# Patient Record
(demographics unavailable — no encounter records)

---

## 2024-12-25 NOTE — HEALTH RISK ASSESSMENT
[Good] : ~his/her~  mood as  good [Yes] : Yes [Monthly or less (1 pt)] : Monthly or less (1 point) [1 or 2 (0 pts)] : 1 or 2 (0 points) [Never (0 pts)] : Never (0 points) [No] : In the past 12 months have you used drugs other than those required for medical reasons? No [No falls in past year] : Patient reported no falls in the past year [0] : 2) Feeling down, depressed, or hopeless: Not at all (0) [PHQ-2 Negative - No further assessment needed] : PHQ-2 Negative - No further assessment needed [Patient reported PAP Smear was abnormal] : Patient reported PAP Smear was abnormal [HIV test declined] : HIV test declined [Hepatitis C test declined] : Hepatitis C test declined [None] : None [With Significant Other] : lives with significant other [Employed] : employed [] :  [Fully functional (bathing, dressing, toileting, transferring, walking, feeding)] : Fully functional (bathing, dressing, toileting, transferring, walking, feeding) [Fully functional (using the telephone, shopping, preparing meals, housekeeping, doing laundry, using] : Fully functional and needs no help or supervision to perform IADLs (using the telephone, shopping, preparing meals, housekeeping, doing laundry, using transportation, managing medications and managing finances) [Reports normal functional visual acuity (ie: able to read med bottle)] : Reports normal functional visual acuity [Never] : Never [Audit-CScore] : 1 [de-identified] : minimal [de-identified] : no restrictions in her diet [[id number]] : 0 [LowDoseCTScan] : NA [EyeExamDate] : NA [Change in mental status noted] : No change in mental status noted [Language] : denies difficulty with language [Behavior] : denies difficulty with behavior [Learning/Retaining New Information] : denies difficulty learning/retaining new information [Handling Complex Tasks] : denies difficulty handling complex tasks [Reasoning] : denies difficulty with reasoning [Spatial Ability and Orientation] : denies difficulty with spatial ability and orientation [Reports changes in hearing] : Reports no changes in hearing [Reports changes in vision] : Reports no changes in vision [Reports changes in dental health] : Reports no changes in dental health [MammogramDate] : NA [PapSmearDate] : 2023 [PapSmearComments] :  HPV and ASCUS on pap and colpo was normal in 2023 [BoneDensityDate] : NA [ColonoscopyDate] : NA [de-identified] : with  [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

## 2024-12-25 NOTE — HEALTH RISK ASSESSMENT
[Good] : ~his/her~  mood as  good [Yes] : Yes [Monthly or less (1 pt)] : Monthly or less (1 point) [1 or 2 (0 pts)] : 1 or 2 (0 points) [Never (0 pts)] : Never (0 points) [No] : In the past 12 months have you used drugs other than those required for medical reasons? No [No falls in past year] : Patient reported no falls in the past year [0] : 2) Feeling down, depressed, or hopeless: Not at all (0) [PHQ-2 Negative - No further assessment needed] : PHQ-2 Negative - No further assessment needed [Patient reported PAP Smear was abnormal] : Patient reported PAP Smear was abnormal [HIV test declined] : HIV test declined [Hepatitis C test declined] : Hepatitis C test declined [None] : None [With Significant Other] : lives with significant other [Employed] : employed [] :  [Fully functional (bathing, dressing, toileting, transferring, walking, feeding)] : Fully functional (bathing, dressing, toileting, transferring, walking, feeding) [Fully functional (using the telephone, shopping, preparing meals, housekeeping, doing laundry, using] : Fully functional and needs no help or supervision to perform IADLs (using the telephone, shopping, preparing meals, housekeeping, doing laundry, using transportation, managing medications and managing finances) [Reports normal functional visual acuity (ie: able to read med bottle)] : Reports normal functional visual acuity [Never] : Never [Audit-CScore] : 1 [de-identified] : minimal [de-identified] : no restrictions in her diet [[id number]] : 0 [LowDoseCTScan] : NA [EyeExamDate] : NA [Change in mental status noted] : No change in mental status noted [Language] : denies difficulty with language [Behavior] : denies difficulty with behavior [Learning/Retaining New Information] : denies difficulty learning/retaining new information [Handling Complex Tasks] : denies difficulty handling complex tasks [Reasoning] : denies difficulty with reasoning [Spatial Ability and Orientation] : denies difficulty with spatial ability and orientation [Reports changes in hearing] : Reports no changes in hearing [Reports changes in vision] : Reports no changes in vision [Reports changes in dental health] : Reports no changes in dental health [MammogramDate] : NA [PapSmearDate] : 2023 [PapSmearComments] :  HPV and ASCUS on pap and colpo was normal in 2023 [BoneDensityDate] : NA [ColonoscopyDate] : NA [de-identified] : with  [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

## 2024-12-25 NOTE — HISTORY OF PRESENT ILLNESS
[de-identified] : 28F PMH nephrolithiasis presents for an annual physical.   She takes oral birth control daily. She denies any medication allergies. She denies any surgeries. She notes her mother with breast cancer at 40yo (BRCA negative). She is starting mammos next year. She denies any smoking and drug use. She drinks alcohol occasionally. She reports minimal physical activity and no restrictions in her diet. 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 She lives with her .    She reports HPV and ASCUS on pap and colpo was normal in 2023. She is starting mammos next year. She has not had a skin check with derm. She got covid vaccine x4 moderna. She got tdap within 10 years. She got the flu shot this season. She reports she is HPV vaccinated.

## 2024-12-25 NOTE — PLAN
[FreeTextEntry1] : 28F PMH nephrolithiasis presents for an annual physical.   # screenings  - negative depression screening - HIV/HCV declined - reports HPV and ASCUS on pap and colpo was normal in 2023 - notes her mother with breast cancer at 40yo (BRCA negative) - starting mammos next year - has not had skin checks - derm referral given  # vaccines - covid vaccine x4 moderna - tdap within 10 years - got the flu shot this season - reports she is HPV vaccinated

## 2024-12-25 NOTE — HISTORY OF PRESENT ILLNESS
[de-identified] : 28F PMH nephrolithiasis presents for an annual physical.   She takes oral birth control daily. She denies any medication allergies. She denies any surgeries. She notes her mother with breast cancer at 40yo (BRCA negative). She is starting mammos next year. She denies any smoking and drug use. She drinks alcohol occasionally. She reports minimal physical activity and no restrictions in her diet. 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 She lives with her .    She reports HPV and ASCUS on pap and colpo was normal in 2023. She is starting mammos next year. She has not had a skin check with derm. She got covid vaccine x4 moderna. She got tdap within 10 years. She got the flu shot this season. She reports she is HPV vaccinated.